# Patient Record
Sex: FEMALE | Race: BLACK OR AFRICAN AMERICAN | NOT HISPANIC OR LATINO | Employment: OTHER | URBAN - METROPOLITAN AREA
[De-identification: names, ages, dates, MRNs, and addresses within clinical notes are randomized per-mention and may not be internally consistent; named-entity substitution may affect disease eponyms.]

---

## 2022-11-28 ENCOUNTER — OFFICE VISIT (OUTPATIENT)
Dept: ENDOCRINOLOGY | Age: 67
End: 2022-11-28

## 2022-11-28 VITALS
BODY MASS INDEX: 26.15 KG/M2 | WEIGHT: 153.2 LBS | SYSTOLIC BLOOD PRESSURE: 130 MMHG | TEMPERATURE: 98.2 F | HEART RATE: 73 BPM | DIASTOLIC BLOOD PRESSURE: 70 MMHG | OXYGEN SATURATION: 99 % | HEIGHT: 64 IN

## 2022-11-28 DIAGNOSIS — I10 ESSENTIAL HYPERTENSION, BENIGN: Primary | ICD-10-CM

## 2022-11-28 DIAGNOSIS — E04.1 THYROID NODULE: ICD-10-CM

## 2022-11-28 DIAGNOSIS — E78.5 DYSLIPIDEMIA: ICD-10-CM

## 2022-11-28 DIAGNOSIS — E11.65 INADEQUATELY CONTROLLED DIABETES MELLITUS (HCC): ICD-10-CM

## 2022-11-28 DIAGNOSIS — E83.52 HYPERCALCEMIA: ICD-10-CM

## 2022-11-28 RX ORDER — LISINOPRIL 20 MG/1
20 TABLET ORAL DAILY
COMMUNITY
Start: 2022-11-16

## 2022-11-28 RX ORDER — AMOXICILLIN 500 MG/1
500 CAPSULE ORAL 3 TIMES DAILY
COMMUNITY

## 2022-11-28 RX ORDER — NAPROXEN 500 MG/1
500 TABLET ORAL 2 TIMES DAILY
COMMUNITY
Start: 2022-11-16

## 2022-11-28 RX ORDER — HYDROCHLOROTHIAZIDE 25 MG/1
25 TABLET ORAL DAILY
COMMUNITY
Start: 2022-11-16

## 2022-11-28 RX ORDER — ATORVASTATIN CALCIUM 40 MG/1
40 TABLET, FILM COATED ORAL DAILY
COMMUNITY
Start: 2022-11-16

## 2022-11-29 ENCOUNTER — TELEPHONE (OUTPATIENT)
Dept: ENDOCRINOLOGY | Age: 67
End: 2022-11-29

## 2022-11-29 NOTE — PROGRESS NOTES
Susan Bach 75 y o  female MRN: [id number]    Encounter: [id number]      Assessment/Plan     Assessment: This is a 75y o -year-old female with   1-T2DM: No complaint/ no hypoglycemic symptoms/ monitors her BS x4/w and can not recall the numbers  Her A1c has dropped to 7% just by taking metformin 1000 mg/d  2-dyslipidemia: her LDL is not in goal range/ increased/ needs higher dose of statin if is compliant on taking current dose  3-HTN and CKDIII: acceptable BP on lisinopril/ proteinuria? 4-serum calcium of 11  6! She carries the diagnosis of primary hyperparathyroidism / she denies having h/o kidney stones or broken bones  She has this diagnosis ? Since 2020 ( per her file)  Needs further evaluation and addressing this issue with the pt    5-? Left thyroid nodule: she says she knew about this  Plan:  1-all meds same except  2-atorvastatin 80 mg/d  3-eye exam report  4-RTV in 3 months with A1c,urine micro alb/cr, vitamin D metabolites, CMP  5-thyroid US ( next time )      CC: Diabetes    History of Present Illness     HPI:  T2DM , dyslipidemia, HTN,hypercalcemia     Review of Systems   Constitutional: Negative for appetite change, fatigue and unexpected weight change  HENT: Negative for trouble swallowing  Eyes: Negative for visual disturbance  Respiratory: Negative for cough, chest tightness and shortness of breath  Cardiovascular: Negative for chest pain, palpitations and leg swelling  Gastrointestinal: Negative for abdominal pain, blood in stool, constipation, diarrhea, nausea and vomiting  Endocrine: Negative for polyphagia and polyuria  Genitourinary: Negative for dysuria and frequency  Skin: Negative for rash and wound  Neurological: Negative for weakness, numbness and headaches  Hematological: Does not bruise/bleed easily  Psychiatric/Behavioral: Negative for confusion and sleep disturbance         Historical Information   Past Medical History:   Diagnosis Date   • Diabetes mellitus ([facility] [address] )    • Dyslipidemia    • Hypertension    • [facility]    • Seasonal allergies    • Thyroid disorder      Past Surgical History:   Procedure Laterality Date   • CHOLECYSTECTOMY     • EYE SURGERY Bilateral    • OTHER SURGICAL HISTORY      Breast cysts   • TUBAL LIGATION       Social History   Social History     Substance and Sexual Activity   Alcohol Use Not Currently    Comment: socially     Social History     Substance and Sexual Activity   Drug Use Never     Social History     Tobacco Use   Smoking Status Never   Smokeless Tobacco Not on file     Family History:   Family History   Problem Relation Age of Onset   • Cancer Mother         Breast   • Diabetes Father        Meds/Allergies   Current Outpatient Medications   Medication Sig Dispense Refill   • atorvastatin (LIPITOR) 40 mg tablet Take 40 mg by mouth daily     • hydrochlorothiazide (HYDRODIURIL) 25 mg tablet Take 25 mg by mouth daily     • lisinopril (ZESTRIL) 20 mg tablet Take 20 mg by mouth daily     • metFORMIN (GLUCOPHAGE) 500 mg tablet Take 500 mg by mouth 2 (two) times a day     • naproxen (NAPROSYN) 500 mg tablet Take 500 mg by mouth 2 (two) times a day     • amoxicillin (AMOXIL) 500 mg capsule Take 500 mg by mouth Three times a day (Patient not taking: Reported on 11/28/2022)       No current facility-administered medications for this visit  Allergies   Allergen Reactions   • Nuts - Food Allergy Itching     Pine nuts make lips swell       Objective   Vitals: Blood pressure 130/70, pulse 73, temperature 98 2 °F (36 8 °C), temperature source Temporal, height 5' 4" (1 626 m), weight 69 5 kg (153 lb 3 2 oz), SpO2 99 %  Physical Exam  Vitals reviewed  Constitutional:       Appearance: Normal appearance  HENT:      Head: Normocephalic  Eyes:      Extraocular Movements: Extraocular movements intact  Neck:      Vascular: No carotid bruit  Comments: Left lower pole ?  15-20 mm nodule  Cardiovascular: Rate and Rhythm: Normal rate and regular rhythm  Pulses: Normal pulses  Heart sounds: Normal heart sounds  No murmur heard  Pulmonary:      Breath sounds: Normal breath sounds  Abdominal:      Palpations: Abdomen is soft  There is no mass  Hernia: No hernia is present  Feet:      Comments: Updated exam by her PCP  Lymphadenopathy:      Cervical: No cervical adenopathy  Skin:     Findings: No rash  Neurological:      General: No focal deficit present  Mental Status: She is oriented to person, place, and time  Cranial Nerves: No cranial nerve deficit  Sensory: No sensory deficit  Psychiatric:         Mood and Affect: Mood normal          Behavior: Behavior normal          Thought Content: Thought content normal          The history was obtained from the review of the chart, patient  Lab Results:   Lab Results   Component Value Date/Time    Hemoglobin A1C 7 0 (H) 11/02/2022 07:53 AM           Imaging Studies: I have personally reviewed pertinent reports  Portions of the record may have been created with voice recognition software  Occasional wrong word or "sound a like" substitutions may have occurred due to the inherent limitations of voice recognition software  Read the chart carefully and recognize, using context, where substitutions have occurred

## 2022-11-29 NOTE — TELEPHONE ENCOUNTER
Left message for patient - per Dr Bindu Almendarez, she is to stop taking hydrochlorothiazide  Asked her to call back to confirm receipt of message

## 2022-12-01 ENCOUNTER — HOSPITAL ENCOUNTER (OUTPATIENT)
Dept: ULTRASOUND IMAGING | Facility: CLINIC | Age: 67
Discharge: HOME | End: 2022-12-01

## 2022-12-01 DIAGNOSIS — E04.1 THYROID NODULE: ICD-10-CM

## 2022-12-07 ENCOUNTER — TELEPHONE (OUTPATIENT)
Dept: ENDOCRINOLOGY | Age: 67
End: 2022-12-07

## 2022-12-07 NOTE — TELEPHONE ENCOUNTER
Spoke to pt - read Dr Zion Magallon last message to her regarding her last scan, as pt had not seen it

## 2023-03-07 ENCOUNTER — TELEPHONE (OUTPATIENT)
Dept: ENDOCRINOLOGY | Facility: CLINIC | Age: 68
End: 2023-03-07

## 2023-03-07 NOTE — TELEPHONE ENCOUNTER
Patient called to confirm appointment  I advised that her appointment is at the [facility] office on 3/29/23 at 0800 with an arrival time of [address]

## 2023-05-02 ENCOUNTER — OFFICE VISIT (OUTPATIENT)
Age: 68
End: 2023-05-02

## 2023-05-02 VITALS
HEART RATE: 67 BPM | HEIGHT: 64 IN | WEIGHT: 151.4 LBS | OXYGEN SATURATION: 99 % | DIASTOLIC BLOOD PRESSURE: 88 MMHG | TEMPERATURE: 98.1 F | BODY MASS INDEX: 25.85 KG/M2 | SYSTOLIC BLOOD PRESSURE: 132 MMHG

## 2023-05-02 DIAGNOSIS — I10 HYPERTENSION, UNSPECIFIED TYPE: Primary | ICD-10-CM

## 2023-05-02 DIAGNOSIS — E11.65 TYPE 2 DIABETES MELLITUS WITH HYPERGLYCEMIA, WITHOUT LONG-TERM CURRENT USE OF INSULIN (HCC): ICD-10-CM

## 2023-05-02 DIAGNOSIS — E04.2 MULTIPLE THYROID NODULES: ICD-10-CM

## 2023-05-02 DIAGNOSIS — E78.5 HYPERLIPIDEMIA, UNSPECIFIED HYPERLIPIDEMIA TYPE: ICD-10-CM

## 2023-05-02 PROBLEM — E10.65 TYPE 1 DIABETES MELLITUS WITH HYPERGLYCEMIA (HCC): Status: ACTIVE | Noted: 2023-05-02

## 2023-05-02 PROBLEM — E10.65 TYPE 1 DIABETES MELLITUS WITH HYPERGLYCEMIA (HCC): Status: RESOLVED | Noted: 2023-05-02 | Resolved: 2023-05-02

## 2023-05-02 NOTE — PROGRESS NOTES
Benjamín Gómez 70 y o  female MRN: [id number]    Encounter: [id number]      Assessment/Plan     Assessment: This is a 70y o -year-old female for follow up    1-T2DM: well controlled on metformin with A1c of 6 6%  She monitors her BS x1-3/d and it averages about 120 fasting and 170 soco mid day with no hypoglycemia  2-dyslipidemia with no ASVD events  3-acceptable BP and GFR on ACE inh    4-MNG: last thyroid US in 12/22 and the nodules are read as stable and she seems euthyroid with no aero digestive obstructive symptoms  Plan:  All meds same  RTV 4 months with A1c,lipid panel,TSH    CC: Diabetes    History of Present Illness     HPI:  T2DM > 5 yrs with no pancreatitis and MNG     Review of Systems   Constitutional: Negative for appetite change, fatigue and unexpected weight change  HENT: Negative for trouble swallowing  Eyes: Negative for visual disturbance  Respiratory: Negative for cough, chest tightness and shortness of breath  Cardiovascular: Negative for chest pain, palpitations and leg swelling  Gastrointestinal: Negative for blood in stool, constipation, diarrhea, nausea and vomiting  Endocrine: Negative for polyphagia and polyuria  Genitourinary: Negative for difficulty urinating, dysuria and frequency  Musculoskeletal: Negative for arthralgias and gait problem  Skin: Negative for rash and wound  Neurological: Negative for weakness, numbness and headaches  Hematological: Does not bruise/bleed easily  Psychiatric/Behavioral: Negative for confusion         Historical Information   Past Medical History:   Diagnosis Date    Diabetes mellitus ([facility] [address] )     Dyslipidemia     Hypertension     Primary hyperparathyroidism ([facility] [address] )     Seasonal allergies     Thyroid disorder      Past Surgical History:   Procedure Laterality Date    CHOLECYSTECTOMY      EYE SURGERY Bilateral     OTHER SURGICAL HISTORY      Breast cysts    TUBAL LIGATION       Social History   Social History "    Substance and Sexual Activity   Alcohol Use Not Currently    Comment: socially     Social History     Substance and Sexual Activity   Drug Use Never     Social History     Tobacco Use   Smoking Status Never   Smokeless Tobacco Never     Family History:   Family History   Problem Relation Age of Onset   • Cancer Mother         Breast   • Diabetes Father        Meds/Allergies   Current Outpatient Medications   Medication Sig Dispense Refill   • atorvastatin (LIPITOR) 40 mg tablet Take 40 mg by mouth daily     • lisinopril (ZESTRIL) 20 mg tablet Take 20 mg by mouth daily     • metFORMIN (GLUCOPHAGE) 500 mg tablet Take 500 mg by mouth 2 (two) times a day       No current facility-administered medications for this visit  Allergies   Allergen Reactions   • Nuts - Food Allergy Itching     Pine nuts make lips swell       Objective   Vitals: Blood pressure 132/88, pulse 67, temperature 98 1 °F (36 7 °C), temperature source Temporal, height 5' 4\" (1 626 m), weight 68 7 kg (151 lb 6 4 oz), SpO2 99 %  Physical Exam  Vitals reviewed  Constitutional:       Appearance: Normal appearance  HENT:      Head: Normocephalic  Eyes:      Extraocular Movements: Extraocular movements intact  Neck:      Vascular: No carotid bruit  Comments: Nodular tight lobe and left barely palpable  Cardiovascular:      Rate and Rhythm: Normal rate and regular rhythm  Pulses: Normal pulses  Dorsalis pedis pulses are 2+ on the right side and 2+ on the left side  Heart sounds: Normal heart sounds  No murmur heard  Pulmonary:      Breath sounds: Normal breath sounds  Abdominal:      Palpations: Abdomen is soft  There is no mass  Tenderness: There is no abdominal tenderness  Musculoskeletal:      Right lower leg: No edema  Left lower leg: No edema  Right foot: No deformity  Left foot: No deformity  Feet:      Right foot:      Protective Sensation: 8 sites tested  8 sites sensed        Skin " "integrity: Skin integrity normal       Toenail Condition: Right toenails are normal       Left foot:      Protective Sensation: 8 sites tested  8 sites sensed  Skin integrity: Skin integrity normal       Toenail Condition: Left toenails are normal    Lymphadenopathy:      Cervical: No cervical adenopathy  Skin:     General: Skin is warm  Findings: No rash  Neurological:      General: No focal deficit present  Mental Status: She is oriented to person, place, and time  Cranial Nerves: No cranial nerve deficit  Psychiatric:         Mood and Affect: Mood normal          Behavior: Behavior normal          The history was obtained from the review of the chart, patient  Lab Results:   Lab Results   Component Value Date/Time    Hemoglobin A1C 6 6 (H) 03/01/2023 08:16 AM    Hemoglobin A1C 7 0 (H) 11/02/2022 07:53 AM           Imaging Studies: I have personally reviewed pertinent reports  Portions of the record may have been created with voice recognition software  Occasional wrong word or \"sound a like\" substitutions may have occurred due to the inherent limitations of voice recognition software  Read the chart carefully and recognize, using context, where substitutions have occurred    "

## 2023-08-29 LAB — HBA1C MFR BLD HPLC: 7.3 %

## 2023-09-20 ENCOUNTER — OFFICE VISIT (OUTPATIENT)
Age: 68
End: 2023-09-20
Payer: MEDICARE

## 2023-09-20 VITALS
WEIGHT: 157.6 LBS | BODY MASS INDEX: 26.91 KG/M2 | HEIGHT: 64 IN | SYSTOLIC BLOOD PRESSURE: 144 MMHG | DIASTOLIC BLOOD PRESSURE: 82 MMHG

## 2023-09-20 DIAGNOSIS — E11.65 TYPE 2 DIABETES MELLITUS WITH HYPERGLYCEMIA, WITHOUT LONG-TERM CURRENT USE OF INSULIN (HCC): ICD-10-CM

## 2023-09-20 DIAGNOSIS — E04.2 MULTIPLE THYROID NODULES: Primary | ICD-10-CM

## 2023-09-20 PROCEDURE — 99214 OFFICE O/P EST MOD 30 MIN: CPT | Performed by: INTERNAL MEDICINE

## 2023-09-20 NOTE — PROGRESS NOTES
Amita Hammond 70 y.o. female MRN: [id number]    Encounter: [id number]      Assessment/Plan     Assessment: This is a 70y.o.-year-old female for follow up on    1-T2DM:  She has been vacationing . She monitors her BS x2/d and it averages about 150. She is due for eye exam. Her A1c has increased to 7.3%  She is reluctant to take more metformin or another med despite my explanation. 2-acceptable BP on ACE inh    3-MNG: euthyroid and asymptomatic/ due for US.    4-dyslipidemia on statin with no ASVD events. Plan:  All meds same  RTV in 4 m with A1c, CMP, urine micro alb/cr  Thyroid US    CC: Diabetes    History of Present Illness     HPI:  T2DM with no h/o pancreatitis, MNG      Review of Systems   Constitutional: Negative for appetite change, fatigue and unexpected weight change. HENT: Negative for trouble swallowing. Eyes: Negative for visual disturbance. Respiratory: Negative for cough, chest tightness and shortness of breath. Cardiovascular: Negative for chest pain, palpitations and leg swelling. Gastrointestinal: Negative for blood in stool, constipation, diarrhea, nausea and vomiting. Endocrine: Negative for polydipsia and polyuria. Genitourinary: Negative for dysuria, frequency and vaginal discharge. Musculoskeletal: Negative for arthralgias. Skin: Negative for rash and wound. Neurological: Negative for weakness, numbness and headaches. Hematological: Does not bruise/bleed easily. Psychiatric/Behavioral: Negative for confusion.        Historical Information   Past Medical History:   Diagnosis Date   • Diabetes mellitus ([address])    • Dyslipidemia    • Hypertension    • Primary hyperparathyroidism ([address])    • Seasonal allergies    • Thyroid disorder      Past Surgical History:   Procedure Laterality Date   • CHOLECYSTECTOMY     • EYE SURGERY Bilateral    • OTHER SURGICAL HISTORY      Breast cysts   • TUBAL LIGATION       Social History   Social History     Substance and Sexual Activity Alcohol Use Not Currently    Comment: socially     Social History     Substance and Sexual Activity   Drug Use Never     Social History     Tobacco Use   Smoking Status Never   Smokeless Tobacco Never     Family History:   Family History   Problem Relation Age of Onset   • Cancer Mother         Breast   • Diabetes Father        Meds/Allergies   Current Outpatient Medications   Medication Sig Dispense Refill   • atorvastatin (LIPITOR) 40 mg tablet Take 40 mg by mouth daily     • lisinopril (ZESTRIL) 20 mg tablet Take 20 mg by mouth daily     • metFORMIN (GLUCOPHAGE) 500 mg tablet Take 500 mg by mouth 2 (two) times a day       No current facility-administered medications for this visit. Allergies   Allergen Reactions   • Nuts - Food Allergy Itching     Pine nuts make lips swell       Objective   Vitals: Blood pressure 144/82, height 5' 4" (1.626 m), weight 71.5 kg (157 lb 9.6 oz). Physical Exam  Vitals reviewed. Constitutional:       Appearance: She is obese. HENT:      Head: Normocephalic. Eyes:      Extraocular Movements: Extraocular movements intact. Neck:      Vascular: No carotid bruit. Comments: Right 10 mm palpable nodule. Cardiovascular:      Rate and Rhythm: Normal rate and regular rhythm. Pulses: Normal pulses. Dorsalis pedis pulses are 2+ on the right side and 2+ on the left side. Heart sounds: Normal heart sounds. No murmur heard. Pulmonary:      Breath sounds: Normal breath sounds. No wheezing. Abdominal:      Palpations: Abdomen is soft. Tenderness: There is no abdominal tenderness. Musculoskeletal:      Right foot: No deformity. Left foot: No deformity. Feet:      Right foot:      Skin integrity: Skin integrity normal.      Toenail Condition: Right toenails are normal.      Left foot:      Skin integrity: Skin integrity normal.      Toenail Condition: Left toenails are normal.   Lymphadenopathy:      Cervical: No cervical adenopathy.    Skin: Findings: No rash. Neurological:      General: No focal deficit present. Mental Status: She is oriented to person, place, and time. Cranial Nerves: No cranial nerve deficit. Sensory: No sensory deficit. Psychiatric:         Mood and Affect: Mood normal.         Behavior: Behavior normal.         Thought Content: Thought content normal.         The history was obtained from the review of the chart, patient. Lab Results:   Lab Results   Component Value Date/Time    Hemoglobin A1C 7.3 (H) 08/29/2023 07:07 AM    Hemoglobin A1C 7.3 08/29/2023 12:00 AM    Hemoglobin A1C 6.6 (H) 03/01/2023 08:16 AM    Hemoglobin A1C 7.0 (H) 11/02/2022 07:53 AM           Imaging Studies: I have personally reviewed pertinent reports. Portions of the record may have been created with voice recognition software. Occasional wrong word or "sound a like" substitutions may have occurred due to the inherent limitations of voice recognition software. Read the chart carefully and recognize, using context, where substitutions have occurred.

## 2024-01-23 ENCOUNTER — OFFICE VISIT (OUTPATIENT)
Age: 69
End: 2024-01-23
Payer: MEDICARE

## 2024-01-23 VITALS
WEIGHT: 149.6 LBS | HEIGHT: 64 IN | DIASTOLIC BLOOD PRESSURE: 84 MMHG | SYSTOLIC BLOOD PRESSURE: 142 MMHG | BODY MASS INDEX: 25.54 KG/M2

## 2024-01-23 DIAGNOSIS — E78.5 HYPERLIPIDEMIA, UNSPECIFIED HYPERLIPIDEMIA TYPE: ICD-10-CM

## 2024-01-23 DIAGNOSIS — I10 PRIMARY HYPERTENSION: ICD-10-CM

## 2024-01-23 DIAGNOSIS — E11.65 TYPE 2 DIABETES MELLITUS WITH HYPERGLYCEMIA, WITHOUT LONG-TERM CURRENT USE OF INSULIN (HCC): Primary | ICD-10-CM

## 2024-01-23 PROCEDURE — 99214 OFFICE O/P EST MOD 30 MIN: CPT | Performed by: STUDENT IN AN ORGANIZED HEALTH CARE EDUCATION/TRAINING PROGRAM

## 2024-01-23 NOTE — ASSESSMENT & PLAN NOTE
Multiple thyroid nodules, no compressive symptoms,   She was advised to schedule thyroid ultrasound

## 2024-01-23 NOTE — ASSESSMENT & PLAN NOTE
Lab Results   Component Value Date    HGBA1C 7.3 (H) 08/29/2023     No new blood work, no sugar log available,  For now I will continue current regimen.  Which includes metformin 500 g twice a day.  She was instructed to check her blood sugar once or twice a day and bring her sugar log to her next visit.  Complete blood work at your convenience.  Ophthalmology follow-up.  Return back in 4 months.

## 2024-01-23 NOTE — PROGRESS NOTES
Established patient Progress Note      Cc: diabetes    Referring Provider  Referral Self  No address on file     History of Present Illness:   Emelina Kim is a 68 y.o. female with a history of type 2 diabetes without long term use of insulin who presented for follow up.        Reports complications of none. Denies recent illness or hospitalizations. Denies recent severe hypoglycemic or severe hyperglycemic episodes. Denies any issues with her current regimen. home glucose monitoring: are performed sporadically      Current regimen:   Metformin 500 mg twice a day    Home blood glucose readings:     SMBG is checked occasionally ,   Blood sugars are ranging from 80 - 150     Opthamology:  overdue, will schedule;   Podiatry: no    Has hypertension: followed by PCP; on lisinopril 20 mg daily  Has hyperlipidemia: followed by PCP; on Lipitor 40 mg daily- tolerating well, no myalgias.    Thyroid disorders: history of multiple thyroid nodules.   History of pancreatitis: no    Patient Active Problem List   Diagnosis    Hypertension    Hyperlipidemia    Multiple thyroid nodules    Type 2 diabetes mellitus with hyperglycemia, without long-term current use of insulin (HCC)      Past Medical History:   Diagnosis Date    Diabetes mellitus (HCC)     Dyslipidemia     Hypertension     Primary hyperparathyroidism (HCC)     Seasonal allergies     Thyroid disorder       Past Surgical History:   Procedure Laterality Date    CHOLECYSTECTOMY      EYE SURGERY Bilateral     OTHER SURGICAL HISTORY      Breast cysts    TUBAL LIGATION        Family History   Problem Relation Age of Onset    Cancer Mother         Breast    Diabetes Father      Social History     Tobacco Use    Smoking status: Never    Smokeless tobacco: Never   Substance Use Topics    Alcohol use: Not Currently     Comment: socially     Allergies   Allergen Reactions  "   Nuts - Food Allergy Itching     Pine nuts make lips swell         Current Outpatient Medications:     atorvastatin (LIPITOR) 40 mg tablet, Take 40 mg by mouth daily, Disp: , Rfl:     lisinopril (ZESTRIL) 20 mg tablet, Take 20 mg by mouth daily, Disp: , Rfl:     metFORMIN (GLUCOPHAGE) 500 mg tablet, Take 500 mg by mouth 2 (two) times a day, Disp: , Rfl:   Review of Systems   Constitutional:  Negative for appetite change, fatigue and unexpected weight change.   HENT:  Negative for trouble swallowing and voice change.    Eyes:  Negative for visual disturbance.   Respiratory:  Negative for cough, shortness of breath and wheezing.    Cardiovascular:  Negative for palpitations and leg swelling.   Gastrointestinal:  Negative for abdominal pain, constipation, diarrhea, nausea and vomiting.   Endocrine: Negative for cold intolerance, heat intolerance, polyphagia and polyuria.   Musculoskeletal:  Negative for arthralgias.   Skin:  Negative for color change, rash and wound.   Neurological:  Negative for dizziness, tremors, weakness, light-headedness, numbness and headaches.   Psychiatric/Behavioral:  Negative for agitation and sleep disturbance. The patient is not nervous/anxious.        Physical Exam:  Body mass index is 25.68 kg/m².  /84 (BP Location: Left arm, Patient Position: Sitting, Cuff Size: Standard)   Ht 5' 4\" (1.626 m)   Wt 67.9 kg (149 lb 9.6 oz)   BMI 25.68 kg/m²    Wt Readings from Last 3 Encounters:   01/23/24 67.9 kg (149 lb 9.6 oz)   09/20/23 71.5 kg (157 lb 9.6 oz)   05/02/23 68.7 kg (151 lb 6.4 oz)       GEN: NAD  E/n/m nl facies,   Eyes: no stare or proptosis,   Neck: trachea midline, thyroid NT to palpation, right lower pole thyroid nodule, noted, not firm, mobile.  Not attached to surrounding structures   CV; heart reg rate s1s2 nl, no Murmur   Resp: CTAB, good effort  Ab+BS  Neuro: no tremor,   Skin: warm and dry, no palmar erythema  Ext:  no edema bilaterally,   Psych: nl mood and affect, " no gross lapses in memory      Labs:     moglobin A1C  <5.7 % 7.3 High  7.3 R 6.6 High  CM 7.0 High  CM 7.9 High  CM 7.2 High  CM     Component  Ref Range & Units 8/29/23  7:07 AM   Cholesterol  <200 mg/dL 159   Triglyceride  <150 mg/dL 71   Cholesterol, HDL, Direct  23 - 92 mg/dL 61   Cholesterol, Non-HDL  <160 mg/dL 98   Cholesterol, LDL, Calculated  <130 mg/dL 84   Comment: LDL Cholesterol was calculated using the Friedewald equation. Direct measurement of LDL is not indicated for this patient based on [facility]'s analytical algorithm for measurement of LDL Cholesterol.   CHOL/HDL Ratio 2.6       Component  Ref Range & Units 8/29/23  7:07 AM   Thyroid Stimulating Hormone  0.45 - 5.33 uIU/mL 0.72     Impression:  1. Type 2 diabetes mellitus with hyperglycemia, without long-term current use of insulin (HCC)    2. Primary hypertension    3. Hyperlipidemia, unspecified hyperlipidemia type           Plan:    Problem List Items Addressed This Visit          Endocrine    Type 2 diabetes mellitus with hyperglycemia, without long-term current use of insulin (HCC) - Primary       Lab Results   Component Value Date    HGBA1C 7.3 (H) 08/29/2023     No new blood work, no sugar log available,  For now I will continue current regimen.  Which includes metformin 500 g twice a day.  She was instructed to check her blood sugar once or twice a day and bring her sugar log to her next visit.  Complete blood work at your convenience.  Ophthalmology follow-up.  Return back in 4 months.              Cardiovascular and Mediastinum    Hypertension     Slightly above goal 142/84.  Monitor blood pressure at home.  Continue lisinopril at current dose.              Other    Hyperlipidemia     Continue Lipitor.                  Discussed with the patient and all questioned fully answered. She will call me if any problems arise.    Counseled patient on diagnostic results, prognosis, risk and benefit of treatment options, instruction for management,  importance of treatment compliance, Risk  factor reduction and impressions      Destini Blair MD

## 2024-01-23 NOTE — ASSESSMENT & PLAN NOTE
Slightly above goal 142/84.  Monitor blood pressure at home.  Continue lisinopril at current dose.

## 2024-05-02 ENCOUNTER — TELEPHONE (OUTPATIENT)
Age: 69
End: 2024-05-02

## 2024-05-02 DIAGNOSIS — Z12.11 ENCOUNTER FOR SCREENING COLONOSCOPY: Primary | ICD-10-CM

## 2024-05-16 ENCOUNTER — APPOINTMENT (OUTPATIENT)
Age: 69
End: 2024-05-16
Payer: MEDICARE

## 2024-05-16 DIAGNOSIS — Z12.11 ENCOUNTER FOR SCREENING COLONOSCOPY: ICD-10-CM

## 2024-05-16 LAB — HEMOCCULT STL QL IA: NEGATIVE

## 2024-05-16 PROCEDURE — G0328 FECAL BLOOD SCRN IMMUNOASSAY: HCPCS

## 2025-06-18 ENCOUNTER — HOSPITAL ENCOUNTER (OUTPATIENT)
Dept: ULTRASOUND IMAGING | Facility: CLINIC | Age: 70
Discharge: HOME | End: 2025-06-18
Payer: MEDICARE

## 2025-06-18 DIAGNOSIS — E04.2 MULTIPLE THYROID NODULES: ICD-10-CM

## 2025-06-18 PROCEDURE — 76536 US EXAM OF HEAD AND NECK: CPT
